# Patient Record
Sex: FEMALE | Race: WHITE | NOT HISPANIC OR LATINO | URBAN - METROPOLITAN AREA
[De-identification: names, ages, dates, MRNs, and addresses within clinical notes are randomized per-mention and may not be internally consistent; named-entity substitution may affect disease eponyms.]

---

## 2017-01-17 ENCOUNTER — THERAPY VISIT (OUTPATIENT)
Dept: PHYSICAL THERAPY | Facility: CLINIC | Age: 42
End: 2017-01-17
Payer: COMMERCIAL

## 2017-01-17 DIAGNOSIS — M53.3 SI (SACROILIAC) JOINT DYSFUNCTION: Primary | ICD-10-CM

## 2017-01-17 PROCEDURE — 97140 MANUAL THERAPY 1/> REGIONS: CPT | Mod: GP | Performed by: PHYSICAL THERAPIST

## 2017-01-17 PROCEDURE — 97161 PT EVAL LOW COMPLEX 20 MIN: CPT | Mod: GP | Performed by: PHYSICAL THERAPIST

## 2017-01-17 NOTE — PROGRESS NOTES
[facility] Initial Evaluation    Subjective:    Hetal Meier is a 41 year old female with a lumbar condition.  Condition occurred with:  Repetition/overuse.  Condition occurred: at home.  This is a recurrent condition  Worse since Oct 2016 when she started doing reconstruction on her basement and has exacerbated an old injury.  Usually had SI issues during all 3 of her pregnancies.    Patient reports pain:  SI joint right.  Radiates to:  Knee right.  Pain is described as burning and is intermittent and reported as 5/10.  Associated symptoms:  Loss of motion/stiffness and loss of strength. Pain is the same all the time.  Symptoms are exacerbated by sitting, twisting and lifting and relieved by rest (exercise).  Since onset symptoms are gradually improving.  Special testing: no imaging.      General health as reported by patient is good.                                            Objective:      Gait:      Deviations:  Knee:  Knee extension decr R               Lumbar/SI Evaluation  ROM:    AROM Lumbar:   Flexion:          To floor, R knee flexes at very end of ROM (-)  Ext:                    Mild loss (+)   Side Bend:        Left:     Right:   Rotation:           Left:     Right:   Side Glide:        Left:  WNL (-)    Right:  WNL (+)        Strength: supine SLR psoas MMT: R: 3+/5, L: 5/5.  Glut med: R: 3+/5, L: 5/5  Lumbar Myotomes:  normal                  Neural Tension/Mobility:      Left side:SLR  negative.     Right side:   SLR  negative.   Lumbar Palpation:      Tenderness not present at Left:    Quadratus Lumborum; Erector Spinae; Piriformis; PSIS; ASIS or Hip Flexors  Tenderness present at Right: ASIS and Hip Flexors  Tenderness not present at Right:  Quadratus Lumborum; Erector Spinae; Piriformis or PSIS        SI joint/Sacrum:    Hypomobile R ileum to PA springing  Provocation:  Negative distraction and compression tests    Left negative at:    Thigh thrust and Sacral thrust  Right  positive at:    Thigh thrust and Sacral thrust                                                   General     ROS    Assessment/Plan:      Patient is a 41 year old female with sacral complaints.    Patient has the following significant findings with corresponding treatment plan.                Diagnosis 1:  R SI dysfunction  Pain -  manual therapy, self management, education and home program  Decreased ROM/flexibility - manual therapy, therapeutic exercise and home program  Decreased joint mobility - manual therapy, therapeutic exercise and home program  Decreased strength - therapeutic exercise, therapeutic activities and home program    Therapy Evaluation Codes:   1) History comprised of:   Personal factors that impact the plan of care:      None.    Comorbidity factors that impact the plan of care are:      None.     Medications impacting care: None.  2) Examination of Body Systems comprised of:   Body structures and functions that impact the plan of care:      Lumbar spine.   Activity limitations that impact the plan of care are:      Squatting/kneeling and Walking.  3) Clinical presentation characteristics are:   Stable/Uncomplicated.  4) Decision-Making    Low complexity using standardized patient assessment instrument and/or measureable assessment of functional outcome.  Cumulative Therapy Evaluation is: Low complexity.    Previous and current functional limitations:  (See Goal Flow Sheet for this information)    Short term and Long term goals: (See Goal Flow Sheet for this information)     Communication ability:  Patient appears to be able to clearly communicate and understand verbal and written communication and follow directions correctly.  Treatment Explanation - The following has been discussed with the patient:   RX ordered/plan of care  Anticipated outcomes  Possible risks and side effects  This patient would benefit from PT intervention to resume normal activities.   Rehab potential is  good.    Frequency:  1 X week, once daily  Duration:  for 3-4 weeks  Discharge Plan:  Achieve all LTG.  Independent in home treatment program.  Reach maximal therapeutic benefit.    Please refer to the daily flowsheet for treatment today, total treatment time and time spent performing 1:1 timed codes.

## 2017-01-23 ENCOUNTER — THERAPY VISIT (OUTPATIENT)
Dept: PHYSICAL THERAPY | Facility: CLINIC | Age: 42
End: 2017-01-23
Payer: COMMERCIAL

## 2017-01-23 DIAGNOSIS — M53.3 SI (SACROILIAC) JOINT DYSFUNCTION: Primary | ICD-10-CM

## 2017-01-23 PROCEDURE — 97110 THERAPEUTIC EXERCISES: CPT | Mod: GP | Performed by: PHYSICAL THERAPIST

## 2017-01-23 PROCEDURE — 97140 MANUAL THERAPY 1/> REGIONS: CPT | Mod: GP | Performed by: PHYSICAL THERAPIST

## 2017-01-23 NOTE — PROGRESS NOTES
Subjective:    HPI                    Objective:    System    Physical Exam    General     ROS    Assessment/Plan:      SUBJECTIVE  Subjective: Overall pain level is a little better.  Left PT last time feeling looser and R knee didn't hurt.  But that only lasted about a day and the pain came back when she tried walking faster at work   Current Pain level: 3/10   Changes in function:  Yes (See Goal flowsheet attached for changes in current functional level)     Adverse reaction to treatment or activity:  None    OBJECTIVE  Objective: Improved mobility of R SI joint compared to last week.  Supine psoas muscle testing symmetrical.  TTP R medial jt line of knee     ASSESSMENT  Hetal continues to require intervention to meet STG and LTG's: PT  Patient is progressing as expected.  Response to therapy has shown an improvement in  pain level and ROM   Progress made towards STG/LTG?  Yes (See Goal flowsheet attached for updates on achievement of STG and LTG)    PLAN  Current treatment program is being advanced to more complex exercises.    PTA/ATC plan:  N/A    Please refer to the daily flowsheet for treatment today, total treatment time and time spent performing 1:1 timed codes.

## 2017-01-31 ENCOUNTER — THERAPY VISIT (OUTPATIENT)
Dept: PHYSICAL THERAPY | Facility: CLINIC | Age: 42
End: 2017-01-31
Payer: COMMERCIAL

## 2017-01-31 DIAGNOSIS — M53.3 SI (SACROILIAC) JOINT DYSFUNCTION: Primary | ICD-10-CM

## 2017-01-31 PROCEDURE — 97110 THERAPEUTIC EXERCISES: CPT | Mod: GP | Performed by: PHYSICAL THERAPIST

## 2017-01-31 PROCEDURE — 97112 NEUROMUSCULAR REEDUCATION: CPT | Mod: GP | Performed by: PHYSICAL THERAPIST

## 2017-01-31 NOTE — PROGRESS NOTES
Subjective:    HPI                    Objective:    System    Physical Exam    General     ROS    Assessment/Plan:      SUBJECTIVE  Subjective: The hips and back feel fine now, no pain there.  Still getting pain in medial knee if she walks too long or too fast   Current Pain level: 2/10   Changes in function:  Yes (See Goal flowsheet attached for changes in current functional level)     Adverse reaction to treatment or activity:  None    OBJECTIVE  Objective: Symmetrical SI mobility with prone PA glides.  Negative SI provocation tests (thigh thrust, sacral thrust, distraction).  TTP R knee medial jt line.  Minor sx reproduction with R McMurrays     ASSESSMENT  Hetal continues to require intervention to meet STG and LTG's: PT  Patient is progressing as expected.  Hip issues are resolving now and knee may be separate issue  Response to therapy has shown an improvement in  pain level and flexibility  Progress made towards STG/LTG?  Yes (See Goal flowsheet attached for updates on achievement of STG and LTG)    PLAN  Current treatment program is being advanced to more complex exercises.  Consider referral to ortho for knee consult    PTA/ATC plan:  N/A    Please refer to the daily flowsheet for treatment today, total treatment time and time spent performing 1:1 timed codes.

## 2017-02-13 ENCOUNTER — OFFICE VISIT (OUTPATIENT)
Dept: ORTHOPEDICS | Facility: CLINIC | Age: 42
End: 2017-02-13
Payer: COMMERCIAL

## 2017-02-13 ENCOUNTER — RADIANT APPOINTMENT (OUTPATIENT)
Dept: GENERAL RADIOLOGY | Facility: CLINIC | Age: 42
End: 2017-02-13
Attending: PEDIATRICS
Payer: COMMERCIAL

## 2017-02-13 VITALS
HEIGHT: 67 IN | DIASTOLIC BLOOD PRESSURE: 68 MMHG | BODY MASS INDEX: 21.19 KG/M2 | SYSTOLIC BLOOD PRESSURE: 110 MMHG | WEIGHT: 135 LBS

## 2017-02-13 DIAGNOSIS — M25.561 RIGHT KNEE PAIN, UNSPECIFIED CHRONICITY: ICD-10-CM

## 2017-02-13 DIAGNOSIS — M25.561 RIGHT KNEE PAIN, UNSPECIFIED CHRONICITY: Primary | ICD-10-CM

## 2017-02-13 PROCEDURE — 20610 DRAIN/INJ JOINT/BURSA W/O US: CPT | Mod: RT | Performed by: PEDIATRICS

## 2017-02-13 PROCEDURE — 99203 OFFICE O/P NEW LOW 30 MIN: CPT | Mod: 25 | Performed by: PEDIATRICS

## 2017-02-13 PROCEDURE — 73562 X-RAY EXAM OF KNEE 3: CPT | Mod: RT | Performed by: PEDIATRICS

## 2017-02-13 NOTE — PROGRESS NOTES
Sports Medicine Clinic Visit    PCP: No primary care provider on file.    Hetal Meier is a 42 year old female who is seen  as a self referral presenting with right knee pain.  Pain has been off and on for about 1 year.  She has been doing a lot more lifting with her lower extremity and this has helped.  In October, she was remolding her basement, and was off of her regular work out routine.  She has had pain with walking, kneeling, twisting.    Did some PT for her SI joint which she has had relief from the pain in her thighs, but the pain on the medial side has progressed.     Injury: gradual onset  Has found at times will stand more on left LE.  Has had some right lateral hip pain related to past pregnancies and carrying children. Often in past would be SI joint issue, but would manifest as medial knee pain and instability.    Has felt like over past 1 year has strengthened hip area. However, remodeling basement past 4-5 months, thought perhaps related to kneeling, or ladders. Then noted some posterior hip and lateral thigh pain. Improved with PT. However, still with medial knee pain.    Has had issues with kneeling in the past, has focal tenderness at anterior knee; often that will improve with getting off knee.    Location of Pain: right medial knee  Duration of Pain: 4 month(s)  Rating of Pain at worst: 7/10  Rating of Pain Currently: 3/10  Symptoms are better with: Nothing  Symptoms are worse with: kneeling, squatting, twisting  Additional Features:   Positive: popping, instability and weakness   Negative: swelling, bruising, grinding, catching, locking, paresthesias, numbness, pain in other joints and systemic symptoms  Other evaluation and/or treatments so far consists of: Nothing  Prior History of related problems: PT    Social History: Physical therapist     Review of Systems  Musculoskeletal: as above  Remainder of review of systems is negative including constitutional, CV, pulmonary, GI, Skin and  "Neurologic except as noted in HPI or medical history.    No past medical history on file.  No past surgical history on file.  No family history on file.  Social History     Social History     Marital status:      Spouse name: N/A     Number of children: N/A     Years of education: N/A     Occupational History     Not on file.     Social History Main Topics     Smoking status: Not on file     Smokeless tobacco: Not on file     Alcohol use Not on file     Drug use: Not on file     Sexual activity: Not on file     Other Topics Concern     Not on file     Social History Narrative       Objective  /68  Ht 5' 7\" (1.702 m)  Wt 135 lb (61.2 kg)  BMI 21.14 kg/m2    GENERAL APPEARANCE: healthy, alert and no distress   GAIT: NORMAL  SKIN: no suspicious lesions or rashes  NEURO: Normal strength and tone, mentation intact and speech normal  PSYCH:  mentation appears normal and affect normal/bright  HEENT: no scleral icterus  CV: no extremity edema  RESP: nonlabored breathing    Right Knee exam    ROM:        Full active and passive ROM with flexion and extension  Mild pain full flexion    Inspection:       no visible ecchymosis        effusion noted mild  Visible fullness anteromedial knee    Skin:       no visible deformities       well perfused       capillary refill brisk    Patellar Motion:        Normal patellar tracking noted through range of motion    Tender:        medial patellar border       medial joint line       Question palpable plica anteromedial knee, small palpable snapping with palpation    Non Tender:         remainder of knee area    Special Tests:        Pain with Dc       neg (-) Lachman       neg (-) anterior drawer       neg (-) posterior drawer       neg (-) varus        neg (-) valgus       + pain with forced extension        Radiology  Visualized radiographs of right knee obtained today, and reviewed the images with the patient.  Impression: Bilateral Cervantes and axial views of the " knees demonstrate no acute osseous abnormality. Joint spaces appear preserved.  Visualized radiographs of right knee obtained today, and reviewed the images with the patient.  Impression: Single lateral view of the right knee demonstrates no acute osseous abnormality.      Assessment:  1. Right knee pain, unspecified chronicity    possible medial meniscus pathology, vs underlying chondromalacia. Also consider medial plica with exam findings.    Plan:  Discussed the assessment with the patient. We discussed the following treatment options: symptom treatment, activity modification/rest, imaging, rehab, injection therapy and support for the affected area. Following discussion, plan:  Topical Treatments: Ice  Over the counter medication: Patient's preferred OTC medication as directed on packaging.  Plain films of the knee reviewed. We discussed additional imaging with MRI, hold given injection today.  Activity Modification: discussed  Rehab: has done  Medical Equipment: discussed use of compression for support.  Steroid injection of the right knee was performed today in clinic  Icing for the next 1-2 days may be helpful for pain. Injection may take 10-14 days to see the full effect.    Discussed steroid injection, including risks, potential benefits, and alternatives.  The patient expressed understanding.  Obtained verbal and written consent and the patient elected to proceed.  Procedure: A steroid injection was performed under aseptic technique at right knee using 1% plain Lidocaine and 40 mg of Kenalog. Bandage applied. This was well tolerated.    Follow up: will leave open ended. If not improving with injection as desired, likely MRI knee next step.  Questions answered. The patient indicates understanding of these issues and agrees with the plan.    Simon Pitts, , CAQ            Disclaimer: This note consists of symbols derived from keyboarding, dictation and/or voice recognition software. As a result, there  may be errors in the script that have gone undetected. Please consider this when interpreting information found in this chart.

## 2017-02-13 NOTE — NURSING NOTE
"Chief Complaint   Patient presents with     Musculoskeletal Problem     right knee pain       Initial /68  Ht 5' 7\" (1.702 m)  Wt 135 lb (61.2 kg)  BMI 21.14 kg/m2 Estimated body mass index is 21.14 kg/(m^2) as calculated from the following:    Height as of this encounter: 5' 7\" (1.702 m).    Weight as of this encounter: 135 lb (61.2 kg).  Medication Reconciliation: complete  "

## 2017-02-13 NOTE — MR AVS SNAPSHOT
"              After Visit Summary   2017    Hetal Meier    MRN: [id number]           Patient Information     Date Of Birth          1975        Visit Information        Provider Department      2017 10:40 AM Simon Pitts,  [facility]        Today's Diagnoses     Right knee pain, unspecified chronicity    -  1       Follow-ups after your visit        Who to contact     If you have questions or need follow up information about today's clinic visit or your schedule please contact [facility] directly at [phone].  Normal or non-critical lab and imaging results will be communicated to you by [facility], letter or phone within 4 business days after the clinic has received the results. If you do not hear from us within 7 days, please contact the clinic through Yebol[employer] or phone. If you have a critical or abnormal lab result, we will notify you by phone as soon as possible.  Submit refill requests through [facility] or call your pharmacy and they will forward the refill request to us. Please allow 3 business days for your refill to be completed.          Additional Information About Your Visit        MyChart Information     [facility] lets you send messages to your doctor, view your test results, renew your prescriptions, schedule appointments and more. To sign up, go to [email or web address] . Click on \"Log in\" on the left side of the screen, which will take you to the Welcome page. Then click on \"Sign up Now\" on the right side of the page.     You will be asked to enter the access code listed below, as well as some personal information. Please follow the directions to create your username and password.     Your access code is: [email or web address]  Expires: 2017 12:54 PM     Your access code will  in 90 days. If you need help or a new code, please call your [facility] clinic or [phone].        Care EveryWhere ID     This is " "your Care EveryWhere ID. This could be used by other organizations to access your [facility] medical records  [id number]        Your Vitals Were     Height BMI (Body Mass Index)                5' 7\" (1.702 m) 21.14 kg/m2           Blood Pressure from Last 3 Encounters:   02/13/17 110/68    Weight from Last 3 Encounters:   02/13/17 135 lb (61.2 kg)               Primary Care Provider    None Specified       No primary provider on file.        Thank you!     Thank you for choosing [facility]  for your care. Our goal is always to provide you with excellent care. Hearing back from our patients is one way we can continue to improve our services. Please take a few minutes to complete the written survey that you may receive in the mail after your visit with us. Thank you!             Your Updated Medication List - Protect others around you: Learn how to safely use, store and throw away your medicines at www.disposemymeds.org.      Notice  As of 2/13/2017 12:54 PM    You have not been prescribed any medications.      "

## 2017-02-19 RX ORDER — TRIAMCINOLONE ACETONIDE 40 MG/ML
40 INJECTION, SUSPENSION INTRA-ARTICULAR; INTRAMUSCULAR ONCE
Qty: 1 ML | Refills: 0 | OUTPATIENT
Start: 2017-02-19 | End: 2017-02-19

## 2017-07-11 PROBLEM — M53.3 SI (SACROILIAC) JOINT DYSFUNCTION: Status: RESOLVED | Noted: 2017-01-17 | Resolved: 2017-07-11

## 2017-07-11 NOTE — PROGRESS NOTES
DISCHARGE REPORT    Progress reporting period is from 1/17/17 to 1/31/17.       SUBJECTIVE  Subjective changes noted by patient:  Patient has not returned to PT as planned.  Current status is unknown.    Current pain level is unknown.     Previous pain level was  (see initial eval)   Changes in function:  Current status is unknown  Adverse reaction to treatment or activity: None    OBJECTIVE  Changes noted in objective findings:  Patient has failed to return to therapy so current objective findings are unknown.    ASSESSMENT/PLAN  Updated problem list and treatment plan: Diagnosis 1:  SI dysfunction    STG/LTGs have been met or progress has been made towards goals:  Current status is unknown  Assessment of Progress: The patient has not returned to therapy. Current status is unknown.  Self Management Plans:  Patient has been instructed in a home treatment program.  Shannon continues to require the following intervention to meet STG and LTG's:  PT intervention is no longer required to meet STG/LTG.    Recommendations:  Discharge from PT

## 2021-01-27 ENCOUNTER — AMBULATORY - HEALTHEAST (OUTPATIENT)
Dept: NURSING | Facility: CLINIC | Age: 46
End: 2021-01-27

## 2021-02-17 ENCOUNTER — AMBULATORY - HEALTHEAST (OUTPATIENT)
Dept: NURSING | Facility: CLINIC | Age: 46
End: 2021-02-17

## 2021-10-09 ENCOUNTER — HEALTH MAINTENANCE LETTER (OUTPATIENT)
Age: 46
End: 2021-10-09

## 2022-10-12 ENCOUNTER — TELEPHONE (OUTPATIENT)
Dept: FAMILY MEDICINE | Facility: CLINIC | Age: 47
End: 2022-10-12

## 2022-11-21 ENCOUNTER — HEALTH MAINTENANCE LETTER (OUTPATIENT)
Age: 47
End: 2022-11-21

## 2023-11-26 ENCOUNTER — HEALTH MAINTENANCE LETTER (OUTPATIENT)
Age: 48
End: 2023-11-26